# Patient Record
Sex: FEMALE | Race: WHITE | Employment: UNEMPLOYED | URBAN - METROPOLITAN AREA
[De-identification: names, ages, dates, MRNs, and addresses within clinical notes are randomized per-mention and may not be internally consistent; named-entity substitution may affect disease eponyms.]

---

## 2018-04-19 ENCOUNTER — NURSE TRIAGE (OUTPATIENT)
Dept: NURSING | Facility: CLINIC | Age: 43
End: 2018-04-19

## 2018-04-19 NOTE — TELEPHONE ENCOUNTER
General question about b/p and activity restrictions and flying and elevation ?  No on any medications, referred to PCP for specific recommendations.    Reason for Disposition    Caller has medication question about med not prescribed by PCP and triager unable to answer question (e.g., compatibility with other med, storage)    Additional Information    Negative: Caller has medication question only, adult not sick, and triager answers question    Protocols used: MEDICATION QUESTION CALL-ADULT-

## 2019-11-20 ENCOUNTER — HOSPITAL LABORATORY (OUTPATIENT)
Dept: OTHER | Facility: CLINIC | Age: 44
End: 2019-11-20

## 2019-11-20 LAB
ALBUMIN SERPL-MCNC: 3.8 G/DL (ref 3.4–5)
ALP SERPL-CCNC: 55 U/L (ref 40–150)
ALT SERPL W P-5'-P-CCNC: 15 U/L (ref 0–50)
ANION GAP SERPL CALCULATED.3IONS-SCNC: 7 MMOL/L (ref 3–14)
AST SERPL W P-5'-P-CCNC: 18 U/L (ref 0–45)
BASOPHILS # BLD AUTO: 0 10E9/L (ref 0–0.2)
BASOPHILS NFR BLD AUTO: 0.8 %
BILIRUB SERPL-MCNC: 0.6 MG/DL (ref 0.2–1.3)
BUN SERPL-MCNC: 3 MG/DL (ref 7–30)
CALCIUM SERPL-MCNC: 8.5 MG/DL (ref 8.5–10.1)
CARBAMAZEPINE SERPL-MCNC: 13 MG/L (ref 4–12)
CHLORIDE SERPL-SCNC: 105 MMOL/L (ref 94–109)
CO2 SERPL-SCNC: 28 MMOL/L (ref 20–32)
CREAT SERPL-MCNC: 0.59 MG/DL (ref 0.52–1.04)
DIFFERENTIAL METHOD BLD: NORMAL
EOSINOPHIL # BLD AUTO: 0.2 10E9/L (ref 0–0.7)
EOSINOPHIL NFR BLD AUTO: 3.3 %
ERYTHROCYTE [DISTWIDTH] IN BLOOD BY AUTOMATED COUNT: 13.6 % (ref 10–15)
GFR SERPL CREATININE-BSD FRML MDRD: >90 ML/MIN/{1.73_M2}
GLUCOSE SERPL-MCNC: 99 MG/DL (ref 70–99)
HCT VFR BLD AUTO: 42 % (ref 35–47)
HGB BLD-MCNC: 13.4 G/DL (ref 11.7–15.7)
IMM GRANULOCYTES # BLD: 0 10E9/L (ref 0–0.4)
IMM GRANULOCYTES NFR BLD: 0.2 %
LYMPHOCYTES # BLD AUTO: 0.9 10E9/L (ref 0.8–5.3)
LYMPHOCYTES NFR BLD AUTO: 16.6 %
MCH RBC QN AUTO: 29.5 PG (ref 26.5–33)
MCHC RBC AUTO-ENTMCNC: 31.9 G/DL (ref 31.5–36.5)
MCV RBC AUTO: 92 FL (ref 78–100)
MONOCYTES # BLD AUTO: 0.4 10E9/L (ref 0–1.3)
MONOCYTES NFR BLD AUTO: 8.5 %
NEUTROPHILS # BLD AUTO: 3.7 10E9/L (ref 1.6–8.3)
NEUTROPHILS NFR BLD AUTO: 70.6 %
NRBC # BLD AUTO: 0 10*3/UL
NRBC BLD AUTO-RTO: 0 /100
PLATELET # BLD AUTO: 220 10E9/L (ref 150–450)
POTASSIUM SERPL-SCNC: 3.3 MMOL/L (ref 3.4–5.3)
PROT SERPL-MCNC: 8.1 G/DL (ref 6.8–8.8)
RBC # BLD AUTO: 4.55 10E12/L (ref 3.8–5.2)
SODIUM SERPL-SCNC: 140 MMOL/L (ref 133–144)
WBC # BLD AUTO: 5.2 10E9/L (ref 4–11)

## 2021-05-28 ENCOUNTER — OFFICE VISIT (OUTPATIENT)
Dept: INTERNAL MEDICINE | Facility: CLINIC | Age: 46
End: 2021-05-28
Payer: COMMERCIAL

## 2021-05-28 VITALS
TEMPERATURE: 98.2 F | SYSTOLIC BLOOD PRESSURE: 96 MMHG | OXYGEN SATURATION: 100 % | HEIGHT: 68 IN | DIASTOLIC BLOOD PRESSURE: 64 MMHG | BODY MASS INDEX: 18.04 KG/M2 | WEIGHT: 119 LBS | RESPIRATION RATE: 16 BRPM | HEART RATE: 70 BPM

## 2021-05-28 DIAGNOSIS — L98.9 SKIN LESION: Primary | ICD-10-CM

## 2021-05-28 PROCEDURE — 11401 EXC TR-EXT B9+MARG 0.6-1 CM: CPT | Mod: 59 | Performed by: NURSE PRACTITIONER

## 2021-05-28 PROCEDURE — 11403 EXC TR-EXT B9+MARG 2.1-3CM: CPT | Performed by: NURSE PRACTITIONER

## 2021-05-28 PROCEDURE — 88305 TISSUE EXAM BY PATHOLOGIST: CPT | Mod: 59 | Performed by: PATHOLOGY

## 2021-05-28 RX ORDER — CARBAMAZEPINE 200 MG
TABLET ORAL
COMMUNITY
Start: 2021-05-17

## 2021-05-28 SDOH — HEALTH STABILITY: MENTAL HEALTH: HOW OFTEN DO YOU HAVE 6 OR MORE DRINKS ON ONE OCCASION?: NOT ASKED

## 2021-05-28 SDOH — HEALTH STABILITY: MENTAL HEALTH: HOW MANY STANDARD DRINKS CONTAINING ALCOHOL DO YOU HAVE ON A TYPICAL DAY?: NOT ASKED

## 2021-05-28 SDOH — HEALTH STABILITY: MENTAL HEALTH: HOW OFTEN DO YOU HAVE A DRINK CONTAINING ALCOHOL?: NOT ASKED

## 2021-05-28 ASSESSMENT — MIFFLIN-ST. JEOR: SCORE: 1223.28

## 2021-05-28 NOTE — NURSING NOTE
"Chief Complaint   Patient presents with     Lesion Removal     pt has about 5 moles to remove under breast left arm and back. dad has some cancerous moles removed recently     initial BP 96/64   Pulse 70   Temp 98.2  F (36.8  C) (Oral)   Resp 16   Ht 1.727 m (5' 8\")   Wt 54 kg (119 lb)   LMP  (LMP Unknown)   SpO2 100%   Breastfeeding No   BMI 18.09 kg/m   Estimated body mass index is 18.09 kg/m  as calculated from the following:    Height as of this encounter: 1.727 m (5' 8\").    Weight as of this encounter: 54 kg (119 lb)..  bp completed using cuff size regular  DEE DEE LOVELL LPN  "

## 2021-05-28 NOTE — PROGRESS NOTES
Assessment & Plan     Skin lesion  irritating growing lesion   - Surgical pathology exam  - EXC BENIGN SKIN LESION TRUNK/ARM/LEG 2.1-3.0 CM  - EXC BENIGN SKIN LESION TRUNK/ARM/LEG 0.6-1.0 CM  - EXC BENIGN SKIN LESION TRUNK/ARM/LEG 0.6-1.0 CM      45 minutes spent on the date of the encounter doing chart review, history and exam, documentation and further activities per the note       Patient Instructions   Vaseline to areas of removal if needed for comfort     Return if swelling, redness, drainage, fever, or other symptoms of concern           No follow-ups on file.    ALEXA Lozoya CNP  [facility] DANA Grant is a 45 year old who presents for the following health issues   Chief Complaint   Patient presents with     Lesion Removal     pt has about 5 moles to remove under breast left arm and back. dad has some cancerous moles removed recently     HPI     Skin lesion that irritate her with clothing     She is working on a divorce     History of stroke as a child and left am is contractured      Review of Systems   Constitutional, HEENT, cardiovascular, pulmonary, GI, , musculoskeletal, neuro, skin, endocrine and psych systems are negative, except as otherwise noted.      Objective    There were no vitals taken for this visit.  There is no height or weight on file to calculate BMI.  Physical Exam   GENERAL: alert and no distress  RESP: lungs clear to auscultation - no rales, rhonchi or wheezes  CV: regular rate and rhythm  SKIN:   After verbal consent procedure done and tolerated well  Left breast   Procedure : Prior to the procedure we discussed expectations for healing, risk of infection, scar formation. Discussed other treatment options available.   Skin was cleansed with skin cleanser. Skin anesthetized with lidocaine with epinephrine. Drapes were laid out. Blade removal of lesion done. Tissue sample was placed in formalin and sent to pathology. Bleeding was  controlled with pressure and 70% aluminum chloride. Minimal bleeding occurred . Dressing was applied and patient left in satisfactory condition.   Widest diameter : 3 cm with 0.1border - 2 lesions together raised and soft        Mid back   Procedure : Prior to the procedure we discussed expectations for healing, risk of infection, scar formation. Discussed other treatment options available.   Skin was cleansed with skin cleanser. Skin anesthetized with lidocaine with epinephrine. Drapes were laid out. Blade removal of lesion done. Tissue sample was placed in formalin and sent to pathology. Bleeding was controlled with pressure and 70% aluminum chloride. Minimal bleeding occurred . Dressing was applied and patient left in satisfactory condition.   Widest diameter : 0.6 cm with 0.1border -raised brown     Right arm   Procedure : Prior to the procedure we discussed expectations for healing, risk of infection, scar formation. Discussed other treatment options available.   Skin was cleansed with skin cleanser. Skin anesthetized with lidocaine with epinephrine. Drapes were laid out. Blade removal of lesion done. Tissue sample was placed in formalin and sent to pathology. Bleeding was controlled with pressure and 70% aluminum chloride. Minimal bleeding occurred . Dressing was applied and patient left in satisfactory condition.   Widest diameter : 0.6 cm with 0.1border - raised brown lesion     PSYCH: mentation appears normal, affect normal/bright    Pathology

## 2021-06-03 LAB — COPATH REPORT: NORMAL
